# Patient Record
Sex: MALE | Race: WHITE | NOT HISPANIC OR LATINO | URBAN - METROPOLITAN AREA
[De-identification: names, ages, dates, MRNs, and addresses within clinical notes are randomized per-mention and may not be internally consistent; named-entity substitution may affect disease eponyms.]

---

## 2017-08-25 ENCOUNTER — IMPORTED ENCOUNTER (OUTPATIENT)
Dept: URBAN - METROPOLITAN AREA CLINIC 1 | Facility: CLINIC | Age: 82
End: 2017-08-25

## 2017-08-25 PROBLEM — Z96.1: Noted: 2017-08-25

## 2017-08-25 PROBLEM — H43.813: Noted: 2017-08-25

## 2017-08-25 PROBLEM — D31.32: Noted: 2017-08-25

## 2017-08-25 PROBLEM — H01.001: Noted: 2017-08-25

## 2017-08-25 PROBLEM — H02.423: Noted: 2017-08-25

## 2017-08-25 PROBLEM — H01.004: Noted: 2017-08-25

## 2017-08-25 PROBLEM — H16.143: Noted: 2017-08-25

## 2017-08-25 PROBLEM — H04.123: Noted: 2017-08-25

## 2017-08-25 PROBLEM — H26.493: Noted: 2017-08-25

## 2017-08-25 PROCEDURE — 92014 COMPRE OPH EXAM EST PT 1/>: CPT

## 2017-08-25 PROCEDURE — 92015 DETERMINE REFRACTIVE STATE: CPT

## 2018-11-09 ENCOUNTER — IMPORTED ENCOUNTER (OUTPATIENT)
Dept: URBAN - METROPOLITAN AREA CLINIC 1 | Facility: CLINIC | Age: 83
End: 2018-11-09

## 2018-11-09 PROBLEM — H01.001: Noted: 2018-11-09

## 2018-11-09 PROBLEM — H16.143: Noted: 2018-11-09

## 2018-11-09 PROBLEM — H43.813: Noted: 2018-11-09

## 2018-11-09 PROBLEM — H26.493: Noted: 2018-11-09

## 2018-11-09 PROBLEM — D31.32: Noted: 2018-11-09

## 2018-11-09 PROBLEM — H04.123: Noted: 2018-11-09

## 2018-11-09 PROBLEM — H01.004: Noted: 2018-11-09

## 2018-11-09 PROCEDURE — 92014 COMPRE OPH EXAM EST PT 1/>: CPT

## 2018-11-09 NOTE — PATIENT DISCUSSION
1.  Choroidal Nevus OS: Appears Benign and stable. Observe for changes. 2. PVD OU - RD precautions. 3.  PCO OU- Observe. 4.  Anterior Blepharitis OU - Begin Daily Hot compresses and lid scrubs were recommended. 5. MARIE w/ PEK OU- Use ATs TID OU routinely. 6.  Pseudophakia OU (Standard OU) 7.  Ptosis OU- observe. Return for an appointment in 1 yr 27 with Dr. Claudeen Cobble.

## 2022-04-02 ASSESSMENT — TONOMETRY
OD_IOP_MMHG: 15
OS_IOP_MMHG: 14
OD_IOP_MMHG: 14
OS_IOP_MMHG: 15

## 2022-04-02 ASSESSMENT — VISUAL ACUITY
OS_GLARE: 20/80
OD_GLARE: 20/80
OS_SC: 20/30
OD_SC: 20/30-2
OS_SC: 20/30
OD_SC: 20/30+2
OS_CC: J1
OD_CC: J1